# Patient Record
Sex: FEMALE | Race: BLACK OR AFRICAN AMERICAN | URBAN - METROPOLITAN AREA
[De-identification: names, ages, dates, MRNs, and addresses within clinical notes are randomized per-mention and may not be internally consistent; named-entity substitution may affect disease eponyms.]

---

## 2017-03-20 ENCOUNTER — HISTORICAL (OUTPATIENT)
Dept: ADMINISTRATIVE | Facility: HOSPITAL | Age: 57
End: 2017-03-20

## 2017-07-17 ENCOUNTER — HISTORICAL (OUTPATIENT)
Dept: ADMINISTRATIVE | Facility: HOSPITAL | Age: 57
End: 2017-07-17

## 2017-07-17 LAB
GLUCOSE SERPL-MCNC: 287 MG/DL (ref 70–105)
HCO3 UR-SCNC: 24 MMOL/L (ref 22–26)
HCT VFR BLD CALC: 32 % (ref 38–51)
HGB BLD-MCNC: 10.9 MG/DL (ref 12–17)
INR PPP: 1.19 (ref 0–1.27)
PCO2 BLDA: 39.4 MMHG (ref 35–45)
PH SMN: 7.39 [PH] (ref 7.35–7.45)
PO2 BLDA: 58 MMHG (ref 80–100)
POC BE: -1 MMOL/L (ref -2–3)
POC IONIZED CALCIUM: 1.17 MMOL/L (ref 1.12–1.32)
POC SATURATED O2: 89 % (ref 96–97)
POC TCO2: 25 MMOL/L (ref 22–27)
POTASSIUM BLD-SCNC: 5.1 MMOL/L (ref 3.5–4.9)
PROTHROMBIN TIME: 14.9 SECOND(S) (ref 12.1–14.2)
SODIUM BLD-SCNC: 136 MMOL/L (ref 138–146)

## 2017-10-09 ENCOUNTER — HISTORICAL (OUTPATIENT)
Dept: SURGERY | Facility: HOSPITAL | Age: 57
End: 2017-10-09

## 2017-10-09 LAB
ABS NEUT (OLG): 5.16 X10(3)/MCL (ref 2.1–9.2)
APTT PPP: 26 SECOND(S) (ref 21–30)
BASOPHILS # BLD AUTO: 0 X10(3)/MCL (ref 0–0.2)
BASOPHILS NFR BLD AUTO: 0 % (ref 0–1)
BUN SERPL-MCNC: 87 MG/DL (ref 7–18)
BUN SERPL-MCNC: 88 MG/DL (ref 7–18)
CALCIUM SERPL-MCNC: 9.3 MG/DL (ref 8.5–10.1)
CALCIUM SERPL-MCNC: 9.8 MG/DL (ref 8.5–10.1)
CHLORIDE SERPL-SCNC: 102 MMOL/L (ref 98–107)
CHLORIDE SERPL-SCNC: 99 MMOL/L (ref 98–107)
CO2 SERPL-SCNC: 21 MMOL/L (ref 21–32)
CO2 SERPL-SCNC: 21 MMOL/L (ref 21–32)
CREAT SERPL-MCNC: 4.95 MG/DL (ref 0.55–1.02)
CREAT SERPL-MCNC: 5.01 MG/DL (ref 0.55–1.02)
EOSINOPHIL # BLD AUTO: 0 X10(3)/MCL (ref 0–0.9)
EOSINOPHIL NFR BLD AUTO: 0 % (ref 0–6.4)
ERYTHROCYTE [DISTWIDTH] IN BLOOD BY AUTOMATED COUNT: 17.9 % (ref 11.5–17)
GLUCOSE SERPL-MCNC: 460 MG/DL (ref 74–106)
GLUCOSE SERPL-MCNC: 499 MG/DL (ref 74–106)
HCT VFR BLD AUTO: 35.5 % (ref 37–47)
HGB BLD-MCNC: 11.3 GM/DL (ref 12–16)
IMM GRANULOCYTES # BLD AUTO: 0.04 10*3/UL (ref 0–0.02)
IMM GRANULOCYTES NFR BLD AUTO: 0.7 % (ref 0–0.43)
INR PPP: 0.9
LYMPHOCYTES # BLD AUTO: 0.35 X10(3)/MCL (ref 0.6–4.6)
LYMPHOCYTES NFR BLD AUTO: 6.3 % (ref 16–44)
MCH RBC QN AUTO: 26.9 PG (ref 27–31)
MCHC RBC AUTO-ENTMCNC: 31.8 GM/DL (ref 33–36)
MCV RBC AUTO: 84.5 FL (ref 80–94)
MONOCYTES # BLD AUTO: 0.03 X10(3)/MCL (ref 0.1–1.3)
MONOCYTES NFR BLD AUTO: 0.5 % (ref 4–12.1)
NEUTROPHILS # BLD AUTO: 5.16 X10(3)/MCL (ref 2.1–9.2)
NEUTROPHILS NFR BLD AUTO: 92.5 % (ref 43–73)
NRBC BLD AUTO-RTO: 0 % (ref 0–0.2)
PLATELET # BLD AUTO: 237 X10(3)/MCL (ref 130–400)
PMV BLD AUTO: 11 FL (ref 7.4–10.4)
POTASSIUM SERPL-SCNC: 5.2 MMOL/L (ref 3.5–5.1)
POTASSIUM SERPL-SCNC: 5.7 MMOL/L (ref 3.5–5.1)
PROTHROMBIN TIME: 10.1 SECOND(S) (ref 9.8–12)
RBC # BLD AUTO: 4.2 X10(6)/MCL (ref 4.2–5.4)
SODIUM SERPL-SCNC: 130 MMOL/L (ref 136–145)
SODIUM SERPL-SCNC: 134 MMOL/L (ref 136–145)
WBC # SPEC AUTO: 5.6 X10(3)/MCL (ref 4.5–11.5)

## 2022-04-29 NOTE — OP NOTE
DATE OF SURGERY:    10/09/2017    SURGEON:  Alberta Del Rio MD    PROCEDURE:    1. Left heart catheterization.  2. Graft angiography.    INDICATION:  Known coronary artery disease, abnormal nuclear stress test.    PROCEDURE IN DETAIL:  Patient was brought to the cardiac catheterization laboratory.  Bilateral groins were prepped and draped in the usual sterile manner, following which copious amount of 1% Xylocaine was instilled into the right groin.  The right common femoral artery was accessed using a standard Seldinger technique, and a 6-[place] sheath introduced, and the coronary angiogram was performed.  The report is as follows:    1. The left main coronary artery is a medium-caliber vessel originating from the left coronary sinus bifurcating to the left anterior descending and the left circumflex artery is mild luminal irregularities are noted with moderate calcification.    2. The left anterior descending artery is a medium-caliber vessel.  The mid segment of this vessel is noted to have an 80% stenotic lesion.  The ycx-do-ecuikm segment of this vessel is 100% occluded with competitive filling noted from the LIMA.    3. The left circumflex artery originates from the left main coronary artery.  This vessel traverses in the left atrioventricular groove.  It gives origin to 3 obtuse marginal branches.  The first obtuse marginal branch is 100% occluded.  The second obtuse marginal branch is noted to have moderate luminal irregularities.  Also noted to have moderate luminal irregularities in the proximal segment of the left circumflex artery.    4. The right coronary artery is a small-caliber nondominant vessel.  The proximal segment of this vessel is noted to have 80% stenotic lesion.  But please note that this vessel is less than 2 mm in diameter.    5. Noted is a patent saphenous vein graft to the first obtuse marginal branch of the circumflex artery.    6. Noted is a patent LIMA to LAD.    7. Noted  is an occluded vein graft to the right coronary artery.   8. The LV function is normal, ejection fraction is approximately 55%.    IMPRESSION:    1. Severe native triple-vessel disease.  2. Patent left internal mammary artery (graft) to left anterior descending and saphenous vein graft to the     first obtuse marginal branch.  Occluded saphenous vein graft to the right coronary artery.   3. Preserved left ventricular systolic function, ejection fraction approximately 55%.        ______________________________  MD SAVANNA Castro/[facility]  DD:  10/09/2017  Time:  01:59PM  DT:  10/10/2017  Time:  12:00AM  Job #:  [id number]